# Patient Record
Sex: FEMALE | Race: BLACK OR AFRICAN AMERICAN | Employment: PART TIME | URBAN - METROPOLITAN AREA
[De-identification: names, ages, dates, MRNs, and addresses within clinical notes are randomized per-mention and may not be internally consistent; named-entity substitution may affect disease eponyms.]

---
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
## 2018-02-25 ENCOUNTER — HOSPITAL ENCOUNTER (OUTPATIENT)
Dept: GENERAL RADIOLOGY | Age: 18
Discharge: HOME | End: 2018-02-25
Payer: MEDICAID

## 2018-02-25 DIAGNOSIS — R07.9 CHEST PAIN, UNSPECIFIED TYPE: ICD-10-CM

## 2019-08-15 ENCOUNTER — APPOINTMENT (OUTPATIENT)
Dept: GENERAL RADIOLOGY | Age: 19
End: 2019-08-15
Payer: MEDICAID

## 2019-08-15 ENCOUNTER — HOSPITAL ENCOUNTER (EMERGENCY)
Age: 19
Discharge: HOME | End: 2019-08-16
Attending: EMERGENCY MEDICINE
Payer: MEDICAID

## 2019-08-15 DIAGNOSIS — I87.2 VENOUS STASIS DERMATITIS OF BOTH LOWER EXTREMITIES: Primary | ICD-10-CM

## 2019-08-15 LAB
ABSOLUTE EOS #: 0.6 K/UL (ref 0–0.4)
ABSOLUTE IMMATURE GRANULOCYTE: ABNORMAL K/UL (ref 0–0.3)
ABSOLUTE LYMPH #: 2.9 K/UL (ref 1–4.8)
ABSOLUTE MONO #: 0.6 K/UL (ref 0.1–1.3)
ANION GAP SERPL CALCULATED.3IONS-SCNC: 9 MMOL/L (ref 9–17)
BASOPHILS # BLD: 1 % (ref 0–2)
BASOPHILS ABSOLUTE: 0.1 K/UL (ref 0–0.2)
BUN BLDV-MCNC: 15 MG/DL (ref 6–20)
BUN/CREAT BLD: NORMAL (ref 9–20)
CALCIUM SERPL-MCNC: 9.4 MG/DL (ref 8.6–10.4)
CHLORIDE BLD-SCNC: 103 MMOL/L (ref 98–107)
CO2: 26 MMOL/L (ref 20–31)
CREAT SERPL-MCNC: 0.73 MG/DL (ref 0.5–0.9)
DIFFERENTIAL TYPE: ABNORMAL
EOSINOPHILS RELATIVE PERCENT: 7 % (ref 0–4)
GFR AFRICAN AMERICAN: >60 ML/MIN
GFR NON-AFRICAN AMERICAN: >60 ML/MIN
GFR SERPL CREATININE-BSD FRML MDRD: NORMAL ML/MIN/{1.73_M2}
GFR SERPL CREATININE-BSD FRML MDRD: NORMAL ML/MIN/{1.73_M2}
GLUCOSE BLD-MCNC: 82 MG/DL (ref 70–99)
HCT VFR BLD CALC: 43 % (ref 36–46)
HEMOGLOBIN: 14.6 G/DL (ref 12–16)
IMMATURE GRANULOCYTES: ABNORMAL %
INR BLD: 1
LYMPHOCYTES # BLD: 34 % (ref 25–45)
MCH RBC QN AUTO: 32.3 PG (ref 26–34)
MCHC RBC AUTO-ENTMCNC: 34 G/DL (ref 31–37)
MCV RBC AUTO: 95.2 FL (ref 80–100)
MONOCYTES # BLD: 8 % (ref 2–8)
NRBC AUTOMATED: ABNORMAL PER 100 WBC
PDW BLD-RTO: 13 % (ref 11.5–14.9)
PLATELET # BLD: 244 K/UL (ref 150–450)
PLATELET ESTIMATE: ABNORMAL
PMV BLD AUTO: 9.2 FL (ref 6–12)
POTASSIUM SERPL-SCNC: 4.1 MMOL/L (ref 3.7–5.3)
PROTHROMBIN TIME: 12.6 SEC (ref 11.8–14.6)
RBC # BLD: 4.52 M/UL (ref 4–5.2)
RBC # BLD: ABNORMAL 10*6/UL
SEG NEUTROPHILS: 50 % (ref 34–64)
SEGMENTED NEUTROPHILS ABSOLUTE COUNT: 4.3 K/UL (ref 1.3–9.1)
SODIUM BLD-SCNC: 138 MMOL/L (ref 135–144)
WBC # BLD: 8.5 K/UL (ref 4.5–13.5)
WBC # BLD: ABNORMAL 10*3/UL

## 2019-08-15 PROCEDURE — 36415 COLL VENOUS BLD VENIPUNCTURE: CPT

## 2019-08-15 PROCEDURE — 85025 COMPLETE CBC W/AUTO DIFF WBC: CPT

## 2019-08-15 PROCEDURE — 99283 EMERGENCY DEPT VISIT LOW MDM: CPT

## 2019-08-15 PROCEDURE — 85610 PROTHROMBIN TIME: CPT

## 2019-08-15 PROCEDURE — 85379 FIBRIN DEGRADATION QUANT: CPT

## 2019-08-15 PROCEDURE — 80048 BASIC METABOLIC PNL TOTAL CA: CPT

## 2019-08-15 PROCEDURE — 73630 X-RAY EXAM OF FOOT: CPT

## 2019-08-15 ASSESSMENT — ENCOUNTER SYMPTOMS
VOMITING: 0
PERI-ORBITAL EDEMA: 0
WHEEZING: 0

## 2019-08-16 VITALS
DIASTOLIC BLOOD PRESSURE: 70 MMHG | HEART RATE: 70 BPM | TEMPERATURE: 97.6 F | WEIGHT: 270 LBS | BODY MASS INDEX: 39.99 KG/M2 | RESPIRATION RATE: 16 BRPM | HEIGHT: 69 IN | OXYGEN SATURATION: 98 % | SYSTOLIC BLOOD PRESSURE: 130 MMHG

## 2019-08-16 LAB — D-DIMER QUANTITATIVE: <0.27 MG/L FEU (ref 0–0.59)

## 2019-08-16 RX ORDER — METHYLPREDNISOLONE 4 MG/1
TABLET ORAL
Qty: 1 KIT | Refills: 0 | Status: SHIPPED | OUTPATIENT
Start: 2019-08-16 | End: 2019-08-22

## 2019-08-16 NOTE — ED PROVIDER NOTES
eMERGENCY dEPARTMENT eNCOUnter   Independent Attestation     Pt Name: Sid Manzo  MRN: [id number]  [place] 2000  Date of evaluation: 8/16/19     Sid Manzo is a 22 y.o. female with CC: Rash      Based on the medical record the care appears appropriate. I was personally available for consultation in the Emergency Department.     Shaunna Montano MD  Attending Emergency Physician                   Shaunna Montano MD  08/16/19 6798

## 2019-08-16 NOTE — ED PROVIDER NOTES
[address] ED  eMERGENCY dEPARTMENT eNCOUnter      Pt Name: Satish Hines  MRN: [id number]  [place] 2000  Date of evaluation: 8/15/19      CHIEF COMPLAINT       Chief Complaint   Patient presents with    Rash         HISTORY OF PRESENT ILLNESS    Satish Hines is a 22 y.o. female who presents complaining of rash to the right lower medial leg started 3 days ago, has venous changes to the bilateral lower legs just above the ankles. Right great toe pain no injury or trauma, but has been exercising more than usual lately. Rash   Location:  Leg  Leg rash location:  R ankle, L ankle and R lower leg  Quality: itchiness and redness    Quality comment:  Right lower leg redness, tender with palpation  Onset quality:  Gradual  Duration:  3 days  Timing:  Constant  Progression:  Unchanged  Chronicity:  New  Context: not exposure to similar rash, not insect bite/sting, not medications and not sun exposure    Relieved by:  Nothing  Worsened by:  Nothing  Ineffective treatments:  None tried  Associated symptoms: joint pain (right great toe pain)    Associated symptoms: no fever, no headaches, no induration, no periorbital edema, not vomiting and not wheezing        REVIEW OF SYSTEMS       Review of Systems   Constitutional: Negative for fever. Respiratory: Negative for wheezing. Gastrointestinal: Negative for vomiting. Musculoskeletal: Positive for arthralgias (right great toe pain). Skin: Positive for rash. Neurological: Negative for headaches. All other systems reviewed and are negative. PAST MEDICAL HISTORY   History reviewed. No pertinent past medical history. SURGICAL HISTORY     History reviewed. No pertinent surgical history.     CURRENT MEDICATIONS       Previous Medications    DEXTROMETHORPHAN-GUAIFENESIN  MG TB12    Take 1 tablet by mouth 2 times daily    IBUPROFEN (ADVIL;MOTRIN) 800 MG TABLET        PSEUDOEPHEDRINE-GUAIFENESIN (MUCINEX D) 120-1200 MG TB12    Take 1 each by

## 2022-02-11 ENCOUNTER — HOSPITAL ENCOUNTER (OUTPATIENT)
Age: 22
Setting detail: SPECIMEN
Discharge: HOME | End: 2022-02-11

## 2022-02-14 LAB
-: NORMAL
C TRACH DNA GENITAL QL NAA+PROBE: NEGATIVE
N. GONORRHOEAE DNA: NEGATIVE
REASON FOR REJECTION: NORMAL
SPECIMEN DESCRIPTION: NORMAL
ZZ NTE CLEAN UP: ORDERED TEST: NORMAL
ZZ NTE WITH NAME CLEAN UP: SPECIMEN SOURCE: NORMAL

## 2022-02-17 LAB
HPV SAMPLE: NORMAL
HPV, GENOTYPE 16: NOT DETECTED
HPV, GENOTYPE 18: NOT DETECTED
HPV, HIGH RISK OTHER: NOT DETECTED
HPV, INTERPRETATION: NORMAL
SPECIMEN DESCRIPTION: NORMAL

## 2022-02-22 LAB — CYTOLOGY REPORT: NORMAL

## 2022-03-03 ENCOUNTER — HOSPITAL ENCOUNTER (OUTPATIENT)
Age: 22
Setting detail: SPECIMEN
Discharge: HOME | End: 2022-03-03

## 2022-03-04 LAB
CANDIDA SPECIES, DNA PROBE: NEGATIVE
GARDNERELLA VAGINALIS, DNA PROBE: NEGATIVE
SOURCE: NORMAL
TRICHOMONAS VAGINALIS DNA: NEGATIVE

## 2022-03-07 ENCOUNTER — HOSPITAL ENCOUNTER (OUTPATIENT)
Age: 22
Setting detail: SPECIMEN
Discharge: HOME | End: 2022-03-07

## 2022-03-08 LAB
ABSOLUTE EOS #: 0.13 K/UL (ref 0–0.44)
ABSOLUTE IMMATURE GRANULOCYTE: 0.03 K/UL (ref 0–0.3)
ABSOLUTE LYMPH #: 2.71 K/UL (ref 1.1–3.7)
ABSOLUTE MONO #: 0.43 K/UL (ref 0.1–1.2)
ALBUMIN SERPL-MCNC: 4.4 G/DL (ref 3.5–5.2)
ALBUMIN/GLOBULIN RATIO: 1.6 (ref 1–2.5)
ALP BLD-CCNC: 56 U/L (ref 35–104)
ALT SERPL-CCNC: 17 U/L (ref 5–33)
ANION GAP SERPL CALCULATED.3IONS-SCNC: 15 MMOL/L (ref 9–17)
AST SERPL-CCNC: 13 U/L
BASOPHILS # BLD: 1 % (ref 0–2)
BASOPHILS ABSOLUTE: 0.04 K/UL (ref 0–0.2)
BILIRUB SERPL-MCNC: 1.11 MG/DL (ref 0.3–1.2)
BUN BLDV-MCNC: 6 MG/DL (ref 6–20)
CALCIUM SERPL-MCNC: 9.4 MG/DL (ref 8.6–10.4)
CHLORIDE BLD-SCNC: 106 MMOL/L (ref 98–107)
CHOLESTEROL/HDL RATIO: 2.2
CHOLESTEROL: 119 MG/DL
CO2: 18 MMOL/L (ref 20–31)
CREAT SERPL-MCNC: 0.61 MG/DL (ref 0.5–0.9)
EOSINOPHILS RELATIVE PERCENT: 2 % (ref 1–4)
GFR AFRICAN AMERICAN: >60 ML/MIN
GFR NON-AFRICAN AMERICAN: >60 ML/MIN
GFR SERPL CREATININE-BSD FRML MDRD: ABNORMAL ML/MIN/{1.73_M2}
GLUCOSE BLD-MCNC: 83 MG/DL (ref 70–99)
HCT VFR BLD CALC: 40.7 % (ref 36.3–47.1)
HDLC SERPL-MCNC: 55 MG/DL
HEMOGLOBIN: 13.7 G/DL (ref 11.9–15.1)
IMMATURE GRANULOCYTES: 1 %
LDL CHOLESTEROL: 55 MG/DL (ref 0–130)
LYMPHOCYTES # BLD: 44 % (ref 24–43)
MCH RBC QN AUTO: 31.6 PG (ref 25.2–33.5)
MCHC RBC AUTO-ENTMCNC: 33.7 G/DL (ref 28.4–34.8)
MCV RBC AUTO: 94 FL (ref 82.6–102.9)
MONOCYTES # BLD: 7 % (ref 3–12)
NRBC AUTOMATED: 0 PER 100 WBC
PDW BLD-RTO: 13.2 % (ref 11.8–14.4)
PLATELET # BLD: 229 K/UL (ref 138–453)
PMV BLD AUTO: 12.6 FL (ref 8.1–13.5)
POTASSIUM SERPL-SCNC: 4.2 MMOL/L (ref 3.7–5.3)
RBC # BLD: 4.33 M/UL (ref 3.95–5.11)
SEG NEUTROPHILS: 45 % (ref 36–65)
SEGMENTED NEUTROPHILS ABSOLUTE COUNT: 2.69 K/UL (ref 1.5–8.1)
SODIUM BLD-SCNC: 139 MMOL/L (ref 135–144)
TOTAL PROTEIN: 7.2 G/DL (ref 6.4–8.3)
TRIGL SERPL-MCNC: 46 MG/DL
TSH SERPL DL<=0.05 MIU/L-ACNC: 0.93 MIU/L (ref 0.3–5)
VITAMIN D 25-HYDROXY: 28.5 NG/ML
WBC # BLD: 6 K/UL (ref 3.5–11.3)

## 2022-10-28 ENCOUNTER — HOSPITAL ENCOUNTER (OUTPATIENT)
Age: 22
Setting detail: SPECIMEN
Discharge: HOME | End: 2022-10-28

## 2022-10-29 LAB
SOURCE: NORMAL
TRICHOMONAS VAGINALI, MOLECULAR: NEGATIVE

## 2022-10-31 LAB
C. TRACHOMATIS DNA ,URINE: NEGATIVE
N. GONORRHOEAE DNA, URINE: NEGATIVE
SPECIMEN DESCRIPTION: NORMAL

## 2022-11-10 ENCOUNTER — HOSPITAL ENCOUNTER (OUTPATIENT)
Age: 22
Setting detail: SPECIMEN
Discharge: HOME | End: 2022-11-10

## 2022-11-11 LAB
BACTERIA: ABNORMAL
BILIRUBIN URINE: NEGATIVE
CASTS UA: ABNORMAL /LPF (ref 0–8)
COLOR: YELLOW
EPITHELIAL CELLS UA: ABNORMAL /HPF (ref 0–5)
GLUCOSE URINE: NEGATIVE
KETONES, URINE: ABNORMAL
LEUKOCYTE ESTERASE, URINE: ABNORMAL
NITRITE, URINE: NEGATIVE
PH UA: 5.5 (ref 5–8)
PROTEIN UA: NEGATIVE
RBC UA: ABNORMAL /HPF (ref 0–4)
SPECIFIC GRAVITY UA: 1.03 (ref 1–1.03)
TURBIDITY: CLEAR
URINE HGB: NEGATIVE
UROBILINOGEN, URINE: NORMAL
WBC UA: ABNORMAL /HPF (ref 0–5)

## 2022-11-12 LAB
CULTURE: ABNORMAL
SPECIMEN DESCRIPTION: ABNORMAL

## 2022-12-05 ENCOUNTER — APPOINTMENT (OUTPATIENT)
Dept: CT IMAGING | Age: 22
End: 2022-12-05
Payer: MEDICAID

## 2022-12-05 ENCOUNTER — HOSPITAL ENCOUNTER (EMERGENCY)
Age: 22
Discharge: ANOTHER HOSPITAL | End: 2022-12-06
Attending: EMERGENCY MEDICINE
Payer: MEDICAID

## 2022-12-05 VITALS
DIASTOLIC BLOOD PRESSURE: 75 MMHG | HEART RATE: 84 BPM | TEMPERATURE: 97.3 F | SYSTOLIC BLOOD PRESSURE: 143 MMHG | RESPIRATION RATE: 16 BRPM | OXYGEN SATURATION: 94 %

## 2022-12-05 DIAGNOSIS — F23 ACUTE PSYCHOSIS (HCC): Primary | ICD-10-CM

## 2022-12-05 LAB
ABSOLUTE EOS #: 0.03 K/UL (ref 0–0.44)
ABSOLUTE IMMATURE GRANULOCYTE: 0.04 K/UL (ref 0–0.3)
ABSOLUTE LYMPH #: 1.83 K/UL (ref 1.1–3.7)
ABSOLUTE MONO #: 0.55 K/UL (ref 0.1–1.2)
ACETAMINOPHEN LEVEL: <5 UG/ML (ref 10–30)
ALBUMIN SERPL-MCNC: 4.3 G/DL (ref 3.5–5.2)
ALBUMIN/GLOBULIN RATIO: 1.4 (ref 1–2.5)
ALP BLD-CCNC: 80 U/L (ref 35–104)
ALT SERPL-CCNC: 14 U/L (ref 5–33)
AMPHETAMINE SCREEN URINE: NEGATIVE
ANION GAP SERPL CALCULATED.3IONS-SCNC: 12 MMOL/L (ref 9–17)
AST SERPL-CCNC: 17 U/L
BARBITURATE SCREEN URINE: NEGATIVE
BASOPHILS # BLD: 1 % (ref 0–2)
BASOPHILS ABSOLUTE: 0.06 K/UL (ref 0–0.2)
BENZODIAZEPINE SCREEN, URINE: POSITIVE
BILIRUB SERPL-MCNC: 0.8 MG/DL (ref 0.3–1.2)
BILIRUBIN URINE: NEGATIVE
BUN BLDV-MCNC: 10 MG/DL (ref 6–20)
CALCIUM SERPL-MCNC: 9.8 MG/DL (ref 8.6–10.4)
CANNABINOID SCREEN URINE: POSITIVE
CASTS UA: ABNORMAL /LPF (ref 0–8)
CHLORIDE BLD-SCNC: 103 MMOL/L (ref 98–107)
CO2: 22 MMOL/L (ref 20–31)
COCAINE METABOLITE, URINE: NEGATIVE
COLOR: YELLOW
CREAT SERPL-MCNC: 0.49 MG/DL (ref 0.5–0.9)
EOSINOPHILS RELATIVE PERCENT: 0 % (ref 1–4)
EPITHELIAL CELLS UA: ABNORMAL /HPF (ref 0–5)
ETHANOL PERCENT: <0.01 %
ETHANOL: <10 MG/DL
FENTANYL URINE: NEGATIVE
GFR SERPL CREATININE-BSD FRML MDRD: >60 ML/MIN/1.73M2
GLUCOSE BLD-MCNC: 81 MG/DL (ref 70–99)
GLUCOSE URINE: NEGATIVE
HCG QUALITATIVE: NEGATIVE
HCT VFR BLD CALC: 39.2 % (ref 36.3–47.1)
HEMOGLOBIN: 13.6 G/DL (ref 11.9–15.1)
IMMATURE GRANULOCYTES: 1 %
KETONES, URINE: ABNORMAL
LEUKOCYTE ESTERASE, URINE: NEGATIVE
LYMPHOCYTES # BLD: 21 % (ref 24–43)
MCH RBC QN AUTO: 31.8 PG (ref 25.2–33.5)
MCHC RBC AUTO-ENTMCNC: 34.7 G/DL (ref 28.4–34.8)
MCV RBC AUTO: 91.6 FL (ref 82.6–102.9)
METHADONE SCREEN, URINE: NEGATIVE
MONOCYTES # BLD: 6 % (ref 3–12)
NITRITE, URINE: NEGATIVE
NRBC AUTOMATED: 0 PER 100 WBC
OPIATES, URINE: NEGATIVE
OXYCODONE SCREEN URINE: NEGATIVE
PDW BLD-RTO: 12.5 % (ref 11.8–14.4)
PH UA: 6.5 (ref 5–8)
PHENCYCLIDINE, URINE: NEGATIVE
PLATELET # BLD: 289 K/UL (ref 138–453)
PMV BLD AUTO: 10.9 FL (ref 8.1–13.5)
POTASSIUM SERPL-SCNC: 4.2 MMOL/L (ref 3.7–5.3)
PROTEIN UA: NEGATIVE
RBC # BLD: 4.28 M/UL (ref 3.95–5.11)
RBC UA: ABNORMAL /HPF (ref 0–4)
SALICYLATE LEVEL: <1 MG/DL (ref 3–10)
SEG NEUTROPHILS: 71 % (ref 36–65)
SEGMENTED NEUTROPHILS ABSOLUTE COUNT: 6.07 K/UL (ref 1.5–8.1)
SODIUM BLD-SCNC: 137 MMOL/L (ref 135–144)
SPECIFIC GRAVITY UA: 1.01 (ref 1–1.03)
TEST INFORMATION: ABNORMAL
TOTAL PROTEIN: 7.4 G/DL (ref 6.4–8.3)
TOXIC TRICYCLIC SC,BLOOD: NEGATIVE
TURBIDITY: CLEAR
URINE HGB: NEGATIVE
UROBILINOGEN, URINE: NORMAL
WBC # BLD: 8.6 K/UL (ref 3.5–11.3)
WBC UA: ABNORMAL /HPF (ref 0–5)

## 2022-12-05 PROCEDURE — 80053 COMPREHEN METABOLIC PANEL: CPT

## 2022-12-05 PROCEDURE — 80143 DRUG ASSAY ACETAMINOPHEN: CPT

## 2022-12-05 PROCEDURE — 85025 COMPLETE CBC W/AUTO DIFF WBC: CPT

## 2022-12-05 PROCEDURE — 80179 DRUG ASSAY SALICYLATE: CPT

## 2022-12-05 PROCEDURE — 6370000000 HC RX 637 (ALT 250 FOR IP): Performed by: STUDENT IN AN ORGANIZED HEALTH CARE EDUCATION/TRAINING PROGRAM

## 2022-12-05 PROCEDURE — 87086 URINE CULTURE/COLONY COUNT: CPT

## 2022-12-05 PROCEDURE — G0480 DRUG TEST DEF 1-7 CLASSES: HCPCS

## 2022-12-05 PROCEDURE — 6360000002 HC RX W HCPCS: Performed by: STUDENT IN AN ORGANIZED HEALTH CARE EDUCATION/TRAINING PROGRAM

## 2022-12-05 PROCEDURE — 81001 URINALYSIS AUTO W/SCOPE: CPT

## 2022-12-05 PROCEDURE — 84703 CHORIONIC GONADOTROPIN ASSAY: CPT

## 2022-12-05 PROCEDURE — 80307 DRUG TEST PRSMV CHEM ANLYZR: CPT

## 2022-12-05 PROCEDURE — 70450 CT HEAD/BRAIN W/O DYE: CPT

## 2022-12-05 RX ORDER — OLANZAPINE 5 MG/1
10 TABLET, ORALLY DISINTEGRATING ORAL ONCE
Status: COMPLETED | OUTPATIENT
Start: 2022-12-05 | End: 2022-12-05

## 2022-12-05 RX ORDER — MIDAZOLAM HYDROCHLORIDE 2 MG/2ML
4 INJECTION, SOLUTION INTRAMUSCULAR; INTRAVENOUS ONCE
Status: COMPLETED | OUTPATIENT
Start: 2022-12-05 | End: 2022-12-05

## 2022-12-05 RX ORDER — DIPHENHYDRAMINE HYDROCHLORIDE 50 MG/ML
25 INJECTION INTRAMUSCULAR; INTRAVENOUS ONCE
Status: COMPLETED | OUTPATIENT
Start: 2022-12-05 | End: 2022-12-05

## 2022-12-05 RX ORDER — MIDAZOLAM HYDROCHLORIDE 2 MG/2ML
4 INJECTION, SOLUTION INTRAMUSCULAR; INTRAVENOUS ONCE
Status: DISCONTINUED | OUTPATIENT
Start: 2022-12-05 | End: 2022-12-06 | Stop reason: HOSPADM

## 2022-12-05 RX ORDER — DIPHENHYDRAMINE HYDROCHLORIDE 50 MG/ML
25 INJECTION INTRAMUSCULAR; INTRAVENOUS
Status: COMPLETED | OUTPATIENT
Start: 2022-12-05 | End: 2022-12-05

## 2022-12-05 RX ORDER — HALOPERIDOL 5 MG/ML
5 INJECTION INTRAMUSCULAR
Status: COMPLETED | OUTPATIENT
Start: 2022-12-05 | End: 2022-12-05

## 2022-12-05 RX ORDER — HALOPERIDOL 5 MG/ML
5 INJECTION INTRAMUSCULAR ONCE
Status: COMPLETED | OUTPATIENT
Start: 2022-12-05 | End: 2022-12-05

## 2022-12-05 RX ADMIN — MIDAZOLAM HYDROCHLORIDE 4 MG: 1 INJECTION, SOLUTION INTRAMUSCULAR; INTRAVENOUS at 14:25

## 2022-12-05 RX ADMIN — DIPHENHYDRAMINE HYDROCHLORIDE 25 MG: 50 INJECTION, SOLUTION INTRAMUSCULAR; INTRAVENOUS at 19:11

## 2022-12-05 RX ADMIN — OLANZAPINE 10 MG: 5 TABLET, ORALLY DISINTEGRATING ORAL at 18:56

## 2022-12-05 RX ADMIN — HALOPERIDOL LACTATE 5 MG: 5 INJECTION, SOLUTION INTRAMUSCULAR at 19:11

## 2022-12-05 RX ADMIN — DIPHENHYDRAMINE HYDROCHLORIDE 25 MG: 50 INJECTION, SOLUTION INTRAMUSCULAR; INTRAVENOUS at 14:26

## 2022-12-05 RX ADMIN — HALOPERIDOL LACTATE 5 MG: 5 INJECTION, SOLUTION INTRAMUSCULAR at 14:26

## 2022-12-05 NOTE — ED NOTES
Patient walks out of room wearing her sunglasses stating \"oh my god I forgot I had my phone! This is so silly. Oh my god I cannot stop laughing! \" And walks back into room. Then walks out of room again and states that she doesn't have signal to call her mom on her phone and \"Let me go outside and call her and I will be right back! \" Patient now standing at nurses station playing on her phone talking to .      David Sweeney RN  12/05/22 1028
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
## 2022-12-05 NOTE — ED NOTES
Patient walked out of room smiling and said very cheerfully \"Hi, sorry I came in all bunched up and not myself. \" This nurse told patient to return to her room and she did.       Margaret Obando RN  12/05/22 5550
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
## 2022-12-05 NOTE — ED NOTES
Patient resting on cart. Not sleeping just resting with her eyes open.      Sandra Quarles RN  12/05/22 0376
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
## 2022-12-05 NOTE — ED NOTES
Patient becoming anxious, pacing, covering head with blanket, and trying to close door. Zyprexa given. Patient took medication with no issues.      Andrea Mark RN  12/05/22 4437 none
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
## 2022-12-05 NOTE — ED NOTES
Consult received by Dr. Thalia Ramirez  Introduced self, explained forensic nursing services, and mandated reporting services. Patient sat on hospital furniture with eyes closed the entire time writer was in room. Patient stated she wanted writer to call her mom, brother and sister before she spoke to anyone in the hospital. Writer stated that those phone calls were going to be made right away and went to make them. Patient then stated \"did you not have a phone in here, I already gave you these numbers, call Dr. Cain Beckford at [facility] or I'm leaving. \" Patient is tangential at times. Patient continued to stay in the room but put her coat on stating she was leaving but very tired. Patient kept repeating, \"I'm not pinked so I'm free to go, you can't keep me here\". Writer spoke to social work, primary nurse and doctors. Updated everyone  on patients orientation and at this time patient is not accepting of services or speaking to writer, and that she wishes to leave.       Kelsie Gtz RN  12/05/22 5740

## 2022-12-05 NOTE — ED NOTES
Delay in triage and care due to the fact that patient is refusing. Attending and resident in room to assess patient capacity to refuse. At this time social work is going to evaluate patient and contact mom for more information as patient is determined to not be at baseline mental status.      Lacho Darby RN  12/05/22 9254

## 2022-12-06 ENCOUNTER — HOSPITAL ENCOUNTER (INPATIENT)
Age: 22
LOS: 2 days | Discharge: HOME | End: 2022-12-08
Attending: PSYCHIATRY & NEUROLOGY | Admitting: PSYCHIATRY & NEUROLOGY
Payer: MEDICAID

## 2022-12-06 LAB
CULTURE: NORMAL
SPECIMEN DESCRIPTION: NORMAL

## 2022-12-06 PROCEDURE — 6370000000 HC RX 637 (ALT 250 FOR IP): Performed by: PSYCHIATRY & NEUROLOGY

## 2022-12-06 PROCEDURE — 6360000002 HC RX W HCPCS: Performed by: PSYCHIATRY & NEUROLOGY

## 2022-12-06 PROCEDURE — 1240000000 HC EMOTIONAL WELLNESS R&B

## 2022-12-06 RX ORDER — GUAIFENESIN, PSEUDOEPHEDRINE HYDROCHLORIDE 600; 60 MG/1; MG/1
2 TABLET, EXTENDED RELEASE ORAL EVERY 12 HOURS
Status: DISCONTINUED | OUTPATIENT
Start: 2022-12-06 | End: 2022-12-08 | Stop reason: HOSPADM

## 2022-12-06 RX ORDER — HYDROXYZINE 50 MG/1
50 TABLET, FILM COATED ORAL 3 TIMES DAILY PRN
Status: DISCONTINUED | OUTPATIENT
Start: 2022-12-06 | End: 2022-12-08 | Stop reason: HOSPADM

## 2022-12-06 RX ORDER — LORAZEPAM 2 MG/ML
2 INJECTION INTRAMUSCULAR EVERY 4 HOURS PRN
Status: DISCONTINUED | OUTPATIENT
Start: 2022-12-06 | End: 2022-12-08 | Stop reason: HOSPADM

## 2022-12-06 RX ORDER — LORAZEPAM 1 MG/1
2 TABLET ORAL EVERY 4 HOURS PRN
Status: DISCONTINUED | OUTPATIENT
Start: 2022-12-06 | End: 2022-12-08 | Stop reason: HOSPADM

## 2022-12-06 RX ORDER — RISPERIDONE 0.5 MG/1
0.5 TABLET ORAL 2 TIMES DAILY
Status: DISCONTINUED | OUTPATIENT
Start: 2022-12-06 | End: 2022-12-08 | Stop reason: HOSPADM

## 2022-12-06 RX ORDER — POLYETHYLENE GLYCOL 3350 17 G/17G
17 POWDER, FOR SOLUTION ORAL DAILY PRN
Status: DISCONTINUED | OUTPATIENT
Start: 2022-12-06 | End: 2022-12-08 | Stop reason: HOSPADM

## 2022-12-06 RX ORDER — HALOPERIDOL 5 MG/1
5 TABLET ORAL EVERY 4 HOURS PRN
Status: DISCONTINUED | OUTPATIENT
Start: 2022-12-06 | End: 2022-12-08 | Stop reason: HOSPADM

## 2022-12-06 RX ORDER — HALOPERIDOL 5 MG/ML
5 INJECTION INTRAMUSCULAR EVERY 4 HOURS PRN
Status: DISCONTINUED | OUTPATIENT
Start: 2022-12-06 | End: 2022-12-08 | Stop reason: HOSPADM

## 2022-12-06 RX ORDER — DIPHENHYDRAMINE HYDROCHLORIDE 50 MG/ML
50 INJECTION INTRAMUSCULAR; INTRAVENOUS EVERY 4 HOURS PRN
Status: DISCONTINUED | OUTPATIENT
Start: 2022-12-06 | End: 2022-12-08 | Stop reason: HOSPADM

## 2022-12-06 RX ORDER — MAGNESIUM HYDROXIDE/ALUMINUM HYDROXICE/SIMETHICONE 120; 1200; 1200 MG/30ML; MG/30ML; MG/30ML
30 SUSPENSION ORAL EVERY 6 HOURS PRN
Status: DISCONTINUED | OUTPATIENT
Start: 2022-12-06 | End: 2022-12-08 | Stop reason: HOSPADM

## 2022-12-06 RX ORDER — TRAZODONE HYDROCHLORIDE 50 MG/1
50 TABLET ORAL NIGHTLY PRN
Status: DISCONTINUED | OUTPATIENT
Start: 2022-12-06 | End: 2022-12-06

## 2022-12-06 RX ORDER — IBUPROFEN 400 MG/1
400 TABLET ORAL EVERY 6 HOURS PRN
Status: DISCONTINUED | OUTPATIENT
Start: 2022-12-06 | End: 2022-12-08 | Stop reason: HOSPADM

## 2022-12-06 RX ORDER — ESCITALOPRAM OXALATE 10 MG/1
5 TABLET ORAL DAILY
Status: DISCONTINUED | OUTPATIENT
Start: 2022-12-06 | End: 2022-12-08 | Stop reason: HOSPADM

## 2022-12-06 RX ORDER — ACETAMINOPHEN 325 MG/1
650 TABLET ORAL EVERY 4 HOURS PRN
Status: DISCONTINUED | OUTPATIENT
Start: 2022-12-06 | End: 2022-12-08 | Stop reason: HOSPADM

## 2022-12-06 RX ORDER — TRAZODONE HYDROCHLORIDE 50 MG/1
50 TABLET ORAL NIGHTLY PRN
Status: DISCONTINUED | OUTPATIENT
Start: 2022-12-06 | End: 2022-12-08 | Stop reason: HOSPADM

## 2022-12-06 RX ADMIN — ESCITALOPRAM OXALATE 5 MG: 10 TABLET ORAL at 13:44

## 2022-12-06 RX ADMIN — DIPHENHYDRAMINE HYDROCHLORIDE 50 MG: 50 INJECTION, SOLUTION INTRAMUSCULAR; INTRAVENOUS at 23:56

## 2022-12-06 RX ADMIN — HYDROXYZINE HYDROCHLORIDE 50 MG: 50 TABLET, FILM COATED ORAL at 03:08

## 2022-12-06 RX ADMIN — TRAZODONE HYDROCHLORIDE 50 MG: 50 TABLET ORAL at 20:46

## 2022-12-06 RX ADMIN — HALOPERIDOL LACTATE 5 MG: 5 INJECTION, SOLUTION INTRAMUSCULAR at 23:56

## 2022-12-06 RX ADMIN — TRAZODONE HYDROCHLORIDE 50 MG: 50 TABLET ORAL at 03:08

## 2022-12-06 RX ADMIN — IBUPROFEN 400 MG: 400 TABLET, FILM COATED ORAL at 03:07

## 2022-12-06 RX ADMIN — HYDROXYZINE HYDROCHLORIDE 50 MG: 50 TABLET, FILM COATED ORAL at 20:46

## 2022-12-06 RX ADMIN — LORAZEPAM 2 MG: 2 INJECTION INTRAMUSCULAR; INTRAVENOUS at 23:56

## 2022-12-06 RX ADMIN — RISPERIDONE 0.5 MG: 0.5 TABLET ORAL at 13:44

## 2022-12-06 RX ADMIN — RISPERIDONE 0.5 MG: 0.5 TABLET ORAL at 20:46

## 2022-12-06 RX ADMIN — IBUPROFEN 400 MG: 400 TABLET, FILM COATED ORAL at 14:51

## 2022-12-06 RX ADMIN — IBUPROFEN 400 MG: 400 TABLET, FILM COATED ORAL at 20:46

## 2022-12-06 ASSESSMENT — PAIN SCALES - GENERAL
PAINLEVEL_OUTOF10: 0
PAINLEVEL_OUTOF10: 5
PAINLEVEL_OUTOF10: 3

## 2022-12-06 ASSESSMENT — PATIENT HEALTH QUESTIONNAIRE - PHQ9: SUM OF ALL RESPONSES TO PHQ QUESTIONS 1-9: 16

## 2022-12-06 ASSESSMENT — PAIN DESCRIPTION - LOCATION: LOCATION: BACK;NECK

## 2022-12-06 ASSESSMENT — SLEEP AND FATIGUE QUESTIONNAIRES
AVERAGE NUMBER OF SLEEP HOURS: 7
DO YOU HAVE DIFFICULTY SLEEPING: YES
SLEEP PATTERN: DIFFICULTY FALLING ASLEEP
DO YOU USE A SLEEP AID: NO

## 2022-12-06 ASSESSMENT — LIFESTYLE VARIABLES
HOW MANY STANDARD DRINKS CONTAINING ALCOHOL DO YOU HAVE ON A TYPICAL DAY: PATIENT DECLINED
HOW OFTEN DO YOU HAVE A DRINK CONTAINING ALCOHOL: NEVER

## 2022-12-06 ASSESSMENT — PAIN DESCRIPTION - DESCRIPTORS: DESCRIPTORS: ACHING;BURNING

## 2022-12-06 NOTE — ED NOTES
Patient increasingly agitated, tachypnic, and tachycardic. Loudly saying the lord's prayer over again and rocking back and forth on bed. Per resident ok to give haldol and benadryl. Patient tolerated well.      Dao Gonzalez RN  12/05/22 1911

## 2022-12-06 NOTE — GROUP NOTE
Group Therapy Note    Date: 12/6/2022    Group Start Time: 1330  Group End Time: 2582  Group Topic: Psychoeducation    ABRAHAM BHJUAN CARLOS Jimenez, SUZIES    Group Therapy Note    Attendees: 7       Patient's Goal:  Patient will identify benefits of creative expression for coping and stress management. Notes:  Patient attended group and participated. Patient was hesitant to participate initially but did join with prompting. Patient was able to participate independently but is guarded with writer.       Status After Intervention:  Improved    Participation Level: Interactive and Minimal    Participation Quality: Appropriate and Resistant      Speech:  hesitant      Thought Process/Content: Delusional  Flight of ideas      Affective Functioning: Blunted      Mood: anxious and fearful      Level of consciousness:  Alert      Response to Learning: Able to verbalize current knowledge/experience and Able to verbalize/acknowledge new learning      Endings: None Reported    Modes of Intervention: Education, Support, Socialization, and Exploration      Discipline Responsible: Psychoeducational Specialist      Signature:  Melissa Merritt, [address]

## 2022-12-06 NOTE — H&P
On license of [facility]    HISTORY AND PHYSICAL EXAMINATION/ CONSULT NOTE            Date:   12/6/2022  Patient name:  Jerry Escobedo  Date of admission:  12/6/2022  2:07 AM  MRN:   [id number]  Account:  [de-identified]  YOB: 2000  PCP:    MURPHY Dietrich  Room:   [facility]-  Code Status:    Full Code    Physician Requesting Consult: Yogesh Marshall MD    Reason for Consult: History and physical, medical management    Chief Complaint:     No chief complaint on file. Medical management    History Obtained From:     Patient, EMR, nursing staff    History of Present Illness:     49-year-old female admitted for acute psychosis    No significant medical history  Patient denies any chest pain palpitation cough difficulty breathing nausea vomiting diarrhea or urinary symptoms      Past Medical History:     History reviewed. No pertinent past medical history. Past Surgical History:     History reviewed. No pertinent surgical history. Medications Prior to Admission:     Prior to Admission medications    Medication Sig Start Date End Date Taking? Authorizing Provider   Dextromethorphan-guaiFENesin  MG TB12 Take 1 tablet by mouth 2 times daily  Patient not taking: Reported on 12/6/2022 4/19/17   Nito Rodriguez MD   Pseudoephedrine-guaiFENesin [facility]) 120-1200 MG TB12 Take 1 each by mouth 2 times daily  Patient not taking: Reported on 12/6/2022 3/23/17   Nito Rodriguez MD   V-R [place] LOTN Apply to skin as directed as needed. Patient not taking: Reported on 12/6/2022 4/19/16   Smooth Wisdom MD   ibuprofen (ADVIL;MOTRIN) 800 MG tablet  2/17/16   Historical Provider, MD        Allergies:     Patient has no known allergies. Social History:     Tobacco:    reports that she has been smoking cigarettes. She does not have any smokeless tobacco history on file. Alcohol:      reports no history of alcohol use.   Drug Use:  reports no history of drug use. Family History:     Family History   Problem Relation Age of Onset    High Blood Pressure Mother     Stroke Father     Diabetes Father     Diabetes Maternal Grandmother        Review of Systems:     Positive and Negative as described in HPI. Denies any shortness of breath or cough  Denies chest pain or palpitations  Denies abdominal pain, diarrhea vomiting  Denies any new numbness tremors or weakness. 10 point review of systems was negative other than mentioned above    Physical Exam:     BP (!) 132/96   Pulse (!) 109   Temp 97.7 °F (36.5 °C) (Oral)   Resp 14   Ht 5' 9\" (1.753 m)   Wt 278 lb (126.1 kg)   LMP 2022   BMI 41.05 kg/m²   Temp (24hrs), Av.3 °F (36.3 °C), Min:96.9 °F (36.1 °C), Max:97.7 °F (36.5 °C)    No results for input(s): POCGLU in the last 72 hours. No intake or output data in the 24 hours ending 22 1439    General Appearance:  alert, well appearing, and in no acute distress  Head:  normocephalic, atraumatic. Eye: no icterus, redness, pupils equal and reactive, extraocular eye movements intact, conjunctiva clear  Ear: normal external ear, no discharge, hearing intact  Nose:  no drainage noted  Mouth: mucous membranes moist  Neck: supple, no carotid bruits, thyroid not palpable  Lungs: Bilateral equal air entry, clear to ausculation, no wheezing, rales or rhonchi, normal effort  Cardiovascular: normal rate, regular rhythm, no murmur, gallop, rub.   Abdomen: Soft, nontender, nondistended, normal bowel sounds, no hepatomegaly or splenomegaly  Neurologic: There are no new focal motor or sensory deficits, normal muscle tone and bulk, no abnormal sensation, normal speech, cranial nerves II through XII grossly intact  Skin: No gross lesions, rashes, bruising or bleeding on exposed skin area  Extremities:  No joint swelling, no pedal edema or calf pain with palpation      Investigations:      Laboratory Testing:  Recent Results (from the past 24 hour(s)) CBC with Auto Differential    Collection Time: 12/05/22  3:43 PM   Result Value Ref Range    WBC 8.6 3.5 - 11.3 k/uL    RBC 4.28 3.95 - 5.11 m/uL    Hemoglobin 13.6 11.9 - 15.1 g/dL    Hematocrit 39.2 36.3 - 47.1 %    MCV 91.6 82.6 - 102.9 fL    MCH 31.8 25.2 - 33.5 pg    MCHC 34.7 28.4 - 34.8 g/dL    RDW 12.5 11.8 - 14.4 %    Platelets 075 418 - 318 k/uL    MPV 10.9 8.1 - 13.5 fL    NRBC Automated 0.0 0.0 per 100 WBC    Seg Neutrophils 71 (H) 36 - 65 %    Lymphocytes 21 (L) 24 - 43 %    Monocytes 6 3 - 12 %    Eosinophils % 0 (L) 1 - 4 %    Basophils 1 0 - 2 %    Immature Granulocytes 1 (H) 0 %    Segs Absolute 6.07 1.50 - 8.10 k/uL    Absolute Lymph # 1.83 1.10 - 3.70 k/uL    Absolute Mono # 0.55 0.10 - 1.20 k/uL    Absolute Eos # 0.03 0.00 - 0.44 k/uL    Basophils Absolute 0.06 0.00 - 0.20 k/uL    Absolute Immature Granulocyte 0.04 0.00 - 0.30 k/uL   Comprehensive Metabolic Panel    Collection Time: 12/05/22  3:43 PM   Result Value Ref Range    Glucose 81 70 - 99 mg/dL    BUN 10 6 - 20 mg/dL    Creatinine 0.49 (L) 0.50 - 0.90 mg/dL    Est, Glom Filt Rate >60 >60 mL/min/1.73m2    Calcium 9.8 8.6 - 10.4 mg/dL    Sodium 137 135 - 144 mmol/L    Potassium 4.2 3.7 - 5.3 mmol/L    Chloride 103 98 - 107 mmol/L    CO2 22 20 - 31 mmol/L    Anion Gap 12 9 - 17 mmol/L    Alkaline Phosphatase 80 35 - 104 U/L    ALT 14 5 - 33 U/L    AST 17 <32 U/L    Total Bilirubin 0.8 0.3 - 1.2 mg/dL    Total Protein 7.4 6.4 - 8.3 g/dL    Albumin 4.3 3.5 - 5.2 g/dL    Albumin/Globulin Ratio 1.4 1.0 - 2.5   TOX SCR, BLD, ED    Collection Time: 12/05/22  3:43 PM   Result Value Ref Range    Acetaminophen Level <5 (L) 10 - 30 ug/mL    Ethanol <10 <10 mg/dL    Ethanol percent <1.873 <9.126 %    Salicylate Lvl <1 (L) 3 - 10 mg/dL    Toxic Tricyclic Sc,Blood NEGATIVE NEGATIVE   HCG Qualitative, Serum    Collection Time: 12/05/22  3:43 PM   Result Value Ref Range    hCG Qual NEGATIVE NEGATIVE   Urinalysis with Microscopic    Collection Time: 12/05/22  4:23 PM   Result Value Ref Range    Color, UA Yellow Yellow    Turbidity UA Clear Clear    Glucose, Ur NEGATIVE NEGATIVE    Bilirubin Urine NEGATIVE NEGATIVE    Ketones, Urine MODERATE (A) NEGATIVE    Specific Gravity, UA 1.013 1.005 - 1.030    Urine Hgb NEGATIVE NEGATIVE    pH, UA 6.5 5.0 - 8.0    Protein, UA NEGATIVE NEGATIVE    Urobilinogen, Urine Normal Normal    Nitrite, Urine NEGATIVE NEGATIVE    Leukocyte Esterase, Urine NEGATIVE NEGATIVE    WBC, UA 2 TO 5 0 - 5 /HPF    RBC, UA 2 TO 5 0 - 4 /HPF    Casts UA  0 - 8 /LPF     2 TO 5 HYALINE Reference range defined for non-centrifuged specimen. Epithelial Cells UA 2 TO 5 0 - 5 /HPF   Urine Drug Screen    Collection Time: 12/05/22  4:23 PM   Result Value Ref Range    Amphetamine Screen, Ur NEGATIVE NEGATIVE    Barbiturate Screen, Ur NEGATIVE NEGATIVE    Benzodiazepine Screen, Urine POSITIVE (A) NEGATIVE    Cocaine Metabolite, Urine NEGATIVE NEGATIVE    Methadone Screen, Urine NEGATIVE NEGATIVE    Opiates, Urine NEGATIVE NEGATIVE    Phencyclidine, Urine NEGATIVE NEGATIVE    Cannabinoid Scrn, Ur POSITIVE (A) NEGATIVE    Oxycodone Screen, Ur NEGATIVE NEGATIVE    Fentanyl, Ur NEGATIVE NEGATIVE    Test Information       Assay provides medical screening only. The absence of expected drug(s) and/or metabolite(s) may indicate diluted or adulterated urine, limitations of testing or timing of collection. Culture, Urine    Collection Time: 12/05/22  4:26 PM    Specimen: Urine, straight catheter   Result Value Ref Range    Specimen Description . URINE,STRAIGHT CATHETER     Culture NO SIGNIFICANT GROWTH        Imaging/Diagonstics:  Recent data reviewed    Assessment :      Primary Problem  <principal problem not specified>    Active Hospital Problems    Diagnosis Date Noted    [facility] 12/05/2022     Priority: Medium       Plan:     Reason for consult: General medical management     Acute psychosis-management per psychiatry, will add TSH rule out organic cause  Labs and vitals reviewed and satisfactory liver enzymes normal   DVT prophylaxis-not required, patient is mobile    Consultations:   Andres Morillo MD  12/6/2022  2:39 PM    Copy sent to MURPHY Brothers    Please note that this chart was generated using voice recognition Dragon dictation software. Although every effort was made to ensure the accuracy of this automated transcription, some errors in transcription may have occurred.
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
## 2022-12-06 NOTE — PLAN OF CARE
Problem: Coping  Goal: Pt/Family able to verbalize concerns and demonstrate effective coping strategies  Description: INTERVENTIONS:  1. Assist patient/family to identify coping skills, available support systems and cultural and spiritual values  2. Provide emotional support, including active listening and acknowledgement of concerns of patient and caregivers  3. Reduce environmental stimuli, as able  4. Instruct patient/family in relaxation techniques, as appropriate  5. Assess for spiritual pain/suffering and initiate Spiritual Care, Psychosocial Clinical Specialist consults as needed  Outcome: Progressing     Problem: Depression/Self Harm  Goal: Effect of psychiatric condition will be minimized and patient will be protected from self harm  Description: INTERVENTIONS:  1. Assess impact of patient's symptoms on level of functioning, self care needs and offer support as indicated  2. Assess patient/family knowledge of depression, impact on illness and need for teaching  3. Provide emotional support, presence and reassurance  4. Assess for possible suicidal thoughts or ideation. If patient expresses suicidal thoughts or statements do not leave alone, initiate Suicide Precautions, move to a room close to the nursing station and obtain sitter  5. Initiate consults as appropriate with Mental Health Professional, Spiritual Care, Psychosocial CNS, and consider a recommendation to the [facility] for a Psychiatric Consultation  Outcome: Progressing   Pt currently denies any thoughts to harm self or others denies any hallucinations out in the milieu isolative aloof.
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
## 2022-12-06 NOTE — GROUP NOTE
Group Therapy Note    Date: 12/6/2022    Group Start Time: 1000  Group End Time: 9700  Group Topic: Psychotherapy    [address] [facility], [place]        Group Therapy Note    Attendees: 6/21       Patient refused to attend psychotherapy group at 10:00 am after encouragement from staff. 1:1 talk time provided as alternative to group session.       Discipline Responsible: /Counselor      Signature:  ZAIRA Yates LSW

## 2022-12-06 NOTE — ED NOTES
Patient accepted to room 120 at the [facility].  [facility] transport to [facility] at [address] [place] [address]  12/05/22 1108

## 2022-12-06 NOTE — PROGRESS NOTES
Behavioral Services  Medicare Certification Upon Admission    I certify that this patient's inpatient psychiatric hospital admission is medically necessary for:    [x] (1) Treatment which could reasonably be expected to improve this patient's condition,       [x] (2) Or for diagnostic study;     AND     [x](2) The inpatient psychiatric services are provided while the individual is under the care of a physician and are included in the individualized plan of care.     Estimated length of stay/service 4-7 days    Plan for post-hospital care home with outpatient [facility] f/u    Electronically signed by Carl Benson MD on 12/6/2022 at 12:47 PM

## 2022-12-06 NOTE — PLAN OF CARE
Problem: Coping  Goal: Pt/Family able to verbalize concerns and demonstrate effective coping strategies  Description: INTERVENTIONS:  1. Assist patient/family to identify coping skills, available support systems and cultural and spiritual values  2. Provide emotional support, including active listening and acknowledgement of concerns of patient and caregivers  3. Reduce environmental stimuli, as able  4. Instruct patient/family in relaxation techniques, as appropriate  5. Assess for spiritual pain/suffering and initiate Spiritual Care, Psychosocial Clinical Specialist consults as needed  12/6/2022 1704 by Urmila Pickard LPN  Outcome: Progressing     Problem: Depression/Self Harm  Goal: Effect of psychiatric condition will be minimized and patient will be protected from self harm  Description: INTERVENTIONS:  1. Assess impact of patient's symptoms on level of functioning, self care needs and offer support as indicated  2. Assess patient/family knowledge of depression, impact on illness and need for teaching  3. Provide emotional support, presence and reassurance  4. Assess for possible suicidal thoughts or ideation. If patient expresses suicidal thoughts or statements do not leave alone, initiate Suicide Precautions, move to a room close to the nursing station and obtain sitter  5.  Initiate consults as appropriate with Mental Health Professional, Spiritual Care, Psychosocial CNS, and consider a recommendation to the [facility] for a Psychiatric Consultation  12/6/2022 1704 by Urmila Pickard LPN  Outcome: Progressing
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
## 2022-12-06 NOTE — GROUP NOTE
Psych-Ed/Relapse Prevention Group Note        Date: December 6, 2022 Start Time: 11am  End Time: 11:45am      Number of Participants in Group & Unit Census:  4    Topic: Socialization and community resources    Goal of Group:Patient will demonstrate improved interpersonal skills. Comments:     Patient did not participate in Psych-Ed/Relapse Prevention group, despite staff encouragement and explanation of benefits. Patient remain seclusive to self. Q15 minute safety checks maintained for patient safety and will continue to encourage patient to attend unit programming.        Signature:  He Dodson [place]

## 2022-12-06 NOTE — BH NOTE
Patient given tobacco quitline number [phone] at this time, refusing to call at this time, states \" I just dont want to quit now\"- patient given information as to the dangers of long term tobacco use. Continue to reinforce the importance of tobacco cessation.
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
## 2022-12-06 NOTE — GROUP NOTE
Group Therapy Note    Date: 12/6/2022    Group Start Time: 0900  Group End Time: 0920  Group Topic: Community Meeting    ABRAHAM Lawrence        Group Therapy Note    Attendees:4       Patient's Goal: to stay awake    Notes:  Goal Setting Group    Status After Intervention:  Unchanged    Participation Level: Minimal    Participation Quality: Resistant      Speech:  normal      Thought Process/Content: Logical      Affective Functioning: Blunted and Flat      Mood: depressed      Level of consciousness:  Alert      Response to Learning: Able to verbalize current knowledge/experience      Endings: None Reported    Modes of Intervention: Education      Discipline Responsible: [address] [employer]      Signature:  Amaury Hawthorne
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
## 2022-12-07 LAB — TSH SERPL DL<=0.05 MIU/L-ACNC: 0.81 UIU/ML (ref 0.3–5)

## 2022-12-07 PROCEDURE — 36415 COLL VENOUS BLD VENIPUNCTURE: CPT

## 2022-12-07 PROCEDURE — 84443 ASSAY THYROID STIM HORMONE: CPT

## 2022-12-07 PROCEDURE — 6370000000 HC RX 637 (ALT 250 FOR IP): Performed by: PSYCHIATRY & NEUROLOGY

## 2022-12-07 PROCEDURE — APPSS30 APP SPLIT SHARED TIME 16-30 MINUTES: Performed by: NURSE PRACTITIONER

## 2022-12-07 PROCEDURE — 1240000000 HC EMOTIONAL WELLNESS R&B

## 2022-12-07 RX ORDER — PROPRANOLOL HYDROCHLORIDE 20 MG/1
10 TABLET ORAL 3 TIMES DAILY
Status: DISCONTINUED | OUTPATIENT
Start: 2022-12-07 | End: 2022-12-08 | Stop reason: HOSPADM

## 2022-12-07 RX ADMIN — ESCITALOPRAM OXALATE 5 MG: 10 TABLET ORAL at 08:08

## 2022-12-07 RX ADMIN — PROPRANOLOL HYDROCHLORIDE 10 MG: 20 TABLET ORAL at 15:25

## 2022-12-07 RX ADMIN — NICOTINE POLACRILEX 2 MG: 2 GUM, CHEWING BUCCAL at 21:57

## 2022-12-07 RX ADMIN — HYDROXYZINE HYDROCHLORIDE 50 MG: 50 TABLET, FILM COATED ORAL at 21:57

## 2022-12-07 RX ADMIN — NICOTINE POLACRILEX 2 MG: 2 GUM, CHEWING BUCCAL at 19:31

## 2022-12-07 RX ADMIN — NICOTINE POLACRILEX 2 MG: 2 GUM, CHEWING BUCCAL at 18:08

## 2022-12-07 RX ADMIN — PROPRANOLOL HYDROCHLORIDE 10 MG: 20 TABLET ORAL at 21:55

## 2022-12-07 RX ADMIN — PROPRANOLOL HYDROCHLORIDE 10 MG: 20 TABLET ORAL at 11:19

## 2022-12-07 RX ADMIN — RISPERIDONE 0.5 MG: 0.5 TABLET ORAL at 08:08

## 2022-12-07 RX ADMIN — TRAZODONE HYDROCHLORIDE 50 MG: 50 TABLET ORAL at 21:57

## 2022-12-07 RX ADMIN — RISPERIDONE 0.5 MG: 0.5 TABLET ORAL at 21:57

## 2022-12-07 RX ADMIN — HYDROXYZINE HYDROCHLORIDE 50 MG: 50 TABLET, FILM COATED ORAL at 08:08

## 2022-12-07 RX ADMIN — GUAIFENESIN AND PSEUDOEPHEDRINE HYDROCHLORIDE 2 TABLET: 600; 60 TABLET, EXTENDED RELEASE ORAL at 21:55

## 2022-12-07 NOTE — PROGRESS NOTES
Daily Progress Note  12/7/2022    Patient Name: Arley Cornejo    CHIEF COMPLAINT:  Acute Psychsois          SUBJECTIVE:     Patient received IM haloperidol and Benadryl the previous evening for agitation. Noted that patient was yelling and slamming the door to her room repeatedly, and was not redirectable as other patients were attempting to sleep. Patient seen face-to-face for follow-up assessment. She is cooperative with discussion. Minimizing of reason for admission. Patient does not believe that she has mental illness and is not forthcoming regarding discussing any psychotic features. Patient's affect is elevated and her thought process is rapid at times. She is able to engage in mostly linear conversation, but some of her statements are illogical.  She mentions [employer] and how people have been out to get her, but not agreeable to discuss this topic more. Patient was started on escitalopram, propranolol and risperidone. She has been compliant with this regimen. Denies any significant side effects, but does state that she has felt drowsy today. She denies feeling dizzy or lightheaded are within appropriate range. She states that she did not sleep well overnight and verbalizes not feeling safe here on the unit, and being on edge at night. Patient did utilize trazodone and hydroxyzine the previous evening. Also utilizing hydroxyzine for increased anxiety today, which she verbalizes feeling that it has been beneficial.    Patient has yet to demonstrate stability and requires inpatient hospitalization for safety.     Psych med compliant: [x] Yes    [] No     E-meds in last 24 hrs: [x] Yes   [] No    Appetite:  [x] Normal/Adequate/Unchanged  [] Increased  [] Decreased      Sleep:       [] Normal/Adequate/Unchanged  [x] Fair  [] Poor      Group Attendance:   [x] Yes  [] Selectively    [] No    Medication Side Effects: Fatigue         Mental Status Exam  Level of consciousness: Alert and awake Appearance: Appropriate attire for setting, seated in chair, with fair  grooming and hygiene   Behavior/Motor: Approachable, no psychomotor abnormalities at this time  Attitude toward examiner: Semi-cooperative, evasive, good eye contact  Speech: Rapid at times, normal volume and tone, good articulation  Mood: Patient reports \"good\"  Affect: Incongruent, anxious  Thought processes: linear, rapid, and illogical statements at times  Thought content: Denies homicidal ideation  Suicidal Ideation: Denies suicidal ideation  Delusions: Current paranoia  Perceptual Disturbance: Patient denies any perceptual disturbances. Does not appear to be responding to internal stimuli. Cognition: Oriented to self, location, time, and situation  Memory: intact  Insight & Judgement: poor     Data   height is 5' 9\" (1.753 m) and weight is 278 lb (126.1 kg). Her temporal temperature is 97.8 °F (36.6 °C). Her blood pressure is 123/68 and her pulse is 82. Her respiration is 14.    Labs:   Admission on 12/06/2022   Component Date Value Ref Range Status    TSH 12/07/2022 0.81  0.30 - 5.00 uIU/mL Final   Admission on 12/05/2022, Discharged on 12/06/2022   Component Date Value Ref Range Status    WBC 12/05/2022 8.6  3.5 - 11.3 k/uL Final    RBC 12/05/2022 4.28  3.95 - 5.11 m/uL Final    Hemoglobin 12/05/2022 13.6  11.9 - 15.1 g/dL Final    Hematocrit 12/05/2022 39.2  36.3 - 47.1 % Final    MCV 12/05/2022 91.6  82.6 - 102.9 fL Final    MCH 12/05/2022 31.8  25.2 - 33.5 pg Final    MCHC 12/05/2022 34.7  28.4 - 34.8 g/dL Final    RDW 12/05/2022 12.5  11.8 - 14.4 % Final    Platelets 66/96/6765 289  138 - 453 k/uL Final    MPV 12/05/2022 10.9  8.1 - 13.5 fL Final    NRBC Automated 12/05/2022 0.0  0.0 per 100 WBC Final    Seg Neutrophils 12/05/2022 71 (A)  36 - 65 % Final    Lymphocytes 12/05/2022 21 (A)  24 - 43 % Final    Monocytes 12/05/2022 6  3 - 12 % Final    Eosinophils % 12/05/2022 0 (A)  1 - 4 % Final    Basophils 12/05/2022 1  0 - 2 % Final    Immature Granulocytes 12/05/2022 1 (A)  0 % Final    Segs Absolute 12/05/2022 6.07  1.50 - 8.10 k/uL Final    Absolute Lymph # 12/05/2022 1.83  1.10 - 3.70 k/uL Final    Absolute Mono # 12/05/2022 0.55  0.10 - 1.20 k/uL Final    Absolute Eos # 12/05/2022 0.03  0.00 - 0.44 k/uL Final    Basophils Absolute 12/05/2022 0.06  0.00 - 0.20 k/uL Final    Absolute Immature Granulocyte 12/05/2022 0.04  0.00 - 0.30 k/uL Final    Glucose 12/05/2022 81  70 - 99 mg/dL Final    BUN 12/05/2022 10  6 - 20 mg/dL Final    Creatinine 12/05/2022 0.49 (A)  0.50 - 0.90 mg/dL Final    Est, Glom Filt Rate 12/05/2022 >60  >60 mL/min/1.73m2 Final    Comment:       Effective Oct 3, 2022        These results are not intended for use in patients <25years of age. eGFR results are calculated without a race factor using the 2021 CKD-EPI equation. Careful clinical correlation is recommended, particularly when comparing to results   calculated using previous equations. The CKD-EPI equation is less accurate in patients with extremes of muscle mass, extra-renal   metabolism of creatine, excessive creatine ingestion, or following therapy that affects   renal tubular secretion.       Calcium 12/05/2022 9.8  8.6 - 10.4 mg/dL Final    Sodium 12/05/2022 137  135 - 144 mmol/L Final    Potassium 12/05/2022 4.2  3.7 - 5.3 mmol/L Final    Chloride 12/05/2022 103  98 - 107 mmol/L Final    CO2 12/05/2022 22  20 - 31 mmol/L Final    Anion Gap 12/05/2022 12  9 - 17 mmol/L Final    Alkaline Phosphatase 12/05/2022 80  35 - 104 U/L Final    ALT 12/05/2022 14  5 - 33 U/L Final    AST 12/05/2022 17  <32 U/L Final    Total Bilirubin 12/05/2022 0.8  0.3 - 1.2 mg/dL Final    Total Protein 12/05/2022 7.4  6.4 - 8.3 g/dL Final    Albumin 12/05/2022 4.3  3.5 - 5.2 g/dL Final    Albumin/Globulin Ratio 12/05/2022 1.4  1.0 - 2.5 Final    Color, UA 12/05/2022 Yellow  Yellow Final    Turbidity UA 12/05/2022 Clear  Clear Final    Glucose, Ur 12/05/2022 NEGATIVE NEGATIVE Final    Bilirubin Urine 12/05/2022 NEGATIVE  NEGATIVE Final    Ketones, Urine 12/05/2022 MODERATE (A)  NEGATIVE Final    Specific Gravity, UA 12/05/2022 1.013  1.005 - 1.030 Final    Urine Hgb 12/05/2022 NEGATIVE  NEGATIVE Final    pH, UA 12/05/2022 6.5  5.0 - 8.0 Final    Protein, UA 12/05/2022 NEGATIVE  NEGATIVE Final    Urobilinogen, Urine 12/05/2022 Normal  Normal Final    Nitrite, Urine 12/05/2022 NEGATIVE  NEGATIVE Final    Leukocyte Esterase, Urine 12/05/2022 NEGATIVE  NEGATIVE Final    WBC, UA 12/05/2022 2 TO 5  0 - 5 /HPF Final    RBC, UA 12/05/2022 2 TO 5  0 - 4 /HPF Final    Reference range defined for non-centrifuged specimen. Casts UA 12/05/2022 2 TO 5 HYALINE Reference range defined for non-centrifuged specimen. 0 - 8 /LPF Final    Epithelial Cells UA 12/05/2022 2 TO 5  0 - 5 /HPF Final    Specimen Description 12/05/2022 . URINE,STRAIGHT CATHETER   Final    Culture 12/05/2022 NO SIGNIFICANT GROWTH   Final    Amphetamine Screen, Ur 12/05/2022 NEGATIVE  NEGATIVE Final    Comment:       (Positive cutoff 1000 ng/mL)                  Barbiturate Screen, Ur 12/05/2022 NEGATIVE  NEGATIVE Final    Comment:       (Positive cutoff 200 ng/mL)                  Benzodiazepine Screen, Urine 12/05/2022 POSITIVE (A)  NEGATIVE Final    Comment:       (Positive cutoff 200 ng/mL)                  Cocaine Metabolite, Urine 12/05/2022 NEGATIVE  NEGATIVE Final    Comment:       (Positive cutoff 300 ng/mL)                  Methadone Screen, Urine 12/05/2022 NEGATIVE  NEGATIVE Final    Comment:       (Positive cutoff 300 ng/mL)                  Opiates, Urine 12/05/2022 NEGATIVE  NEGATIVE Final    Comment:       (Positive cutoff 300 ng/mL)                  Phencyclidine, Urine 12/05/2022 NEGATIVE  NEGATIVE Final    Comment:       (Positive cutoff 25 ng/mL)                  Cannabinoid Scrn, Ur 12/05/2022 POSITIVE (A)  NEGATIVE Final    Comment:       (Positive cutoff 50 ng/mL)                  Oxycodone Screen, Ur 12/05/2022 NEGATIVE  NEGATIVE Final    Comment:       (Positive cutoff 100 ng/mL)                  Fentanyl, Ur 12/05/2022 NEGATIVE  NEGATIVE Final    Comment:       (Positive cutoff  5 ng/ml)            Test Information 12/05/2022 Assay provides medical screening only. The absence of expected drug(s) and/or metabolite(s) may indicate diluted or adulterated urine, limitations of testing or timing of collection. Final    Comment: Testing for legal purposes should be confirmed by another method. To request confirmation   of test result, please call the lab within 7 days of sample submission. Acetaminophen Level 12/05/2022 <5 (A)  10 - 30 ug/mL Final    Ethanol 12/05/2022 <10  <10 mg/dL Final    Ethanol percent 12/05/2022 <0.010  <3.169 % Final    Salicylate Lvl 20/73/8215 <1 (A)  3 - 10 mg/dL Final    Toxic Tricyclic Sc,Blood 16/08/1843 NEGATIVE  NEGATIVE Final    hCG Qual 12/05/2022 NEGATIVE  NEGATIVE Final    Comment: Specimens with hCG levels near the threshold of the test (25 mIU/mL) may give a negative or   indeterminate result. In such cases, another test should be performed with a new specimen   in 48-72 hours. If early pregnancy is suspected clinically in this setting, correlation   with quantitative serum b-hCG level is suggested. [employer] has confirmed the use of plasma for this test. This has not been cleared   or approved by the U.S. Food and Drug Administration. The FDA has determined that such   clearance is not necessary. Reviewed patient's current plan of care and vital signs with nursing staff.     Labs reviewed: [x] Yes  Last EKG in EMR reviewed: [x] Yes    Medications  Current Facility-Administered Medications: propranolol (INDERAL) tablet 10 mg, 10 mg, Oral, TID  acetaminophen (TYLENOL) tablet 650 mg, 650 mg, Oral, Q4H PRN  aluminum & magnesium hydroxide-simethicone (MAALOX) 200-200-20 MG/5ML suspension 30 mL, 30 mL, Oral, Q6H PRN  haloperidol lactate (HALDOL) injection 5 mg, 5 mg, IntraMUSCular, Q4H PRN **AND** diphenhydrAMINE (BENADRYL) injection 50 mg, 50 mg, IntraMUSCular, Q4H PRN **AND** LORazepam (ATIVAN) injection 2 mg, 2 mg, IntraMUSCular, Q4H PRN  haloperidol (HALDOL) tablet 5 mg, 5 mg, Oral, Q4H PRN **AND** LORazepam (ATIVAN) tablet 2 mg, 2 mg, Oral, Q4H PRN  hydrOXYzine HCl (ATARAX) tablet 50 mg, 50 mg, Oral, TID PRN  ibuprofen (ADVIL;MOTRIN) tablet 400 mg, 400 mg, Oral, Q6H PRN  nicotine polacrilex (NICORETTE) gum 2 mg, 2 mg, Oral, PRN  polyethylene glycol (GLYCOLAX) packet 17 g, 17 g, Oral, Daily PRN  traZODone (DESYREL) tablet 50 mg, 50 mg, Oral, Nightly PRN  pseudoephedrine-guaiFENesin (MUCINEX D)  MG per extended release tablet 2 tablet, 2 tablet, Oral, Q12H  escitalopram (LEXAPRO) tablet 5 mg, 5 mg, Oral, Daily  risperiDONE (RISPERDAL) tablet 0.5 mg, 0.5 mg, Oral, BID    ASSESSMENT  Acute psychosis ([facility] [address]         HANDOFF  Patient's symptoms: are modestly improving, but are unstable for discharge  Medications as determined by attending physician  Consider titrating risperidone 1 mg twice daily  Encourage participation in groups and milieu. Probable discharge is to be determined by MD    Electronically signed by MURPHY Marie CNP on 12/7/2022 at 3:28 PM    **This report has been created using voice recognition software. It may contain minor errors which are inherent in voice recognition technology. **     I independently saw and evaluated the patient. I reviewed the nurse practitioners documentation above. Any additional comments or changes to the nurse practitioners documentation are stated below otherwise agree with assessment. Plan will be as follows:  Discussed risk benefits and alternatives with the patient including increasing Risperdal.  Patient declined to move forward with this at this time and wants to observe another day and new medication.   Reasonable request and will monitor in mid  PLAN  Patient s symptoms   show modest signs of improvement  Monitor on current medication for now  Attempt to develop insight  Psycho-education conducted. Supportive Therapy conducted.   Probable discharge is likely 2 to 3 days  Follow-up daily while on inpatient unit

## 2022-12-07 NOTE — PROGRESS NOTES
12/07/22 1531   Encounter Summary   Encounter Overview/Reason  Spiritual/Emotional Needs   Service Provided For: Patient   Referral/Consult From: Ousmane   Last Encounter  12/07/22   Complexity of Encounter Moderate   Begin Time 0230   End Time  0300   Total Time Calculated 30 min   Spiritual/Emotional needs   Type Spiritual Support   Behavioral Health    Type  [facility]   Assessment/Intervention/Outcome   Assessment Calm   Intervention Active listening;Prayer (assurance of)/[place];Sustaining Presence/Ministry of presence   Outcome Receptive; Expressed Gratitude;Engaged in conversation

## 2022-12-07 NOTE — GROUP NOTE
Group Therapy Note    Date: 12/7/2022    Group Start Time: 0900  Group End Time: 0930  Group Topic: Nursing    ABRAHAM BHI C    Analilia Batista LPN        Group Therapy Note    Attendees: 7/20       Patient's Goal:   Goal setting    Notes:   Patient understanding the importance of goal setting    Status After Intervention:  Improved    Participation Level: Interactive    Participation Quality: Attentive and Sharing      Speech:  normal      Thought Process/Content: Logical      Affective Functioning: Congruent      Mood: anxious      Level of consciousness:  Alert      Response to Learning: Able to verbalize/acknowledge new learning      Endings: None Reported    Modes of Intervention: Education and Support      Discipline Responsible: Licensed Practical Nurse      Signature:  Analilia Batista LPN

## 2022-12-07 NOTE — GROUP NOTE
Group Therapy Note    Date: 12/7/2022    Group Start Time: 1000  Group End Time: 1040  Group Topic: Psychotherapy    ABRAHAM JACOME    ZAIRA Zelaya LSW        Group Therapy Note    Attendees: 7/20       Patient's Goal:  Discussion on Healthy vs Unhealthy Coping Strategies    Status After Intervention:  Improved    Participation Level:  Active Listener and Interactive    Participation Quality: Appropriate, Attentive, and Sharing      Speech:  normal      Thought Process/Content: Logical      Affective Functioning: Congruent      Mood: euthymic      Level of consciousness:  Alert, Oriented x4, and Attentive      Response to Learning: Able to verbalize current knowledge/experience and Able to verbalize/acknowledge new learning      Endings: None Reported    Modes of Intervention: Education, Support, and Socialization      Discipline Responsible: /Counselor      Signature:  ZAIRA Zelaya LSW

## 2022-12-07 NOTE — PLAN OF CARE
Problem: Depression/Self Harm  Goal: Effect of psychiatric condition will be minimized and patient will be protected from self harm  Description: INTERVENTIONS:  1. Assess impact of patient's symptoms on level of functioning, self care needs and offer support as indicated  2. Assess patient/family knowledge of depression, impact on illness and need for teaching  3. Provide emotional support, presence and reassurance  4. Assess for possible suicidal thoughts or ideation. If patient expresses suicidal thoughts or statements do not leave alone, initiate Suicide Precautions, move to a room close to the nursing station and obtain sitter  5. Initiate consults as appropriate with Mental Health Professional, Spiritual Care, Psychosocial CNS, and consider a recommendation to the LIP for a Psychiatric Consultation  12/6/2022 2236 by Darra Fothergill, LPN  Note: Patient denies thoughts of self harm at this time. Patient agrees to seek out staff if they begin having thoughts of harming self or they need to talk. Q15min safety checks continue      Problem: Pain  Goal: Verbalizes/displays adequate comfort level or baseline comfort level  Note: Patient had minimal pain today and asked for pain medication. Was educated on relaxation techniques to help calm the body down when in pain. Educated to ask staff for pain medication when in need. Q15 min safety checks.

## 2022-12-07 NOTE — GROUP NOTE
Group Therapy Note    Date: 12/7/2022    Group Start Time: 1330  Group End Time: 8117  Group Topic: Psychoeducation    ABRAHAM BHJUAN CARLOS Jimenez, SUZIES    Group Therapy Note    Attendees: 8     Patient's Goal:  Patient will identify benefits of creative expression for coping and stress management. Notes:  Patient attended group and participated. Status After Intervention:  Improved    Participation Level:  Active Listener and Interactive    Participation Quality: Appropriate and Attentive      Speech:  Normal      Thought Process/Content: Logical  Linear      Affective Functioning: Blunted      Mood: Euthymic      Level of consciousness:  Alert, Oriented x4, and Attentive      Response to Learning: Able to verbalize current knowledge/experience, Able to verbalize/acknowledge new learning, Able to retain information, Able to change behavior, and Progressing to goal      Endings: None Reported    Modes of Intervention: Education, Support, Socialization, and Exploration      Discipline Responsible: Psychoeducational Specialist      Signature:  Charlsie Bloch, [address]

## 2022-12-07 NOTE — BH NOTE
Patient was much more calm, PRN effective. Patient in and out of bed and eventually laid down in bed with encouragement from staff. Has been resting quietly and asleep.

## 2022-12-07 NOTE — BH NOTE
Patient given tobacco quitline number [phone] at this time, refusing to call at this time, states \" I just dont want to quit now\"- patient given information as to the dangers of long term tobacco use. Continue to reinforce the importance of tobacco cessation.

## 2022-12-07 NOTE — BH NOTE
Patient became agitated as evidenced by pacing and continual slamming of door walking out into common area and up to desk asking when she was leaving and to make phone calls. Repeated this scenario multiple times. Patient would walk to door and open it stand there close it and repeat a dozen times and just walk back into room. PRN medication was offered and patient was accepting of it. Haldol, Ativan and Benadryl PRN IM. Medicine was given with no issues. Patient currently lying in bed. Q15min safety checks continuous for patient safety.

## 2022-12-07 NOTE — GROUP NOTE
Psych-Ed/Relapse Prevention Group Note        Date: December 7, 2022 Start Time: 11am  End Time: 11:45am      Number of Participants in Group & Unit Census:  7    Topic: Communication skills    Goal of Group:Patient will identify benefits of alternative communication methods to improve relationships. Comments:     Patient did not participate in Psych-Ed/Relapse Prevention group, despite staff encouragement and explanation of benefits. Patient remain seclusive to self. Q15 minute safety checks maintained for patient safety and will continue to encourage patient to attend unit programming.          Signature:  Lj Sigala, [address]

## 2022-12-08 VITALS
RESPIRATION RATE: 14 BRPM | BODY MASS INDEX: 41.18 KG/M2 | HEIGHT: 69 IN | SYSTOLIC BLOOD PRESSURE: 128 MMHG | TEMPERATURE: 97.7 F | WEIGHT: 278 LBS | DIASTOLIC BLOOD PRESSURE: 87 MMHG | HEART RATE: 93 BPM

## 2022-12-08 PROCEDURE — 6370000000 HC RX 637 (ALT 250 FOR IP): Performed by: PSYCHIATRY & NEUROLOGY

## 2022-12-08 RX ORDER — HYDROXYZINE 50 MG/1
50 TABLET, FILM COATED ORAL 3 TIMES DAILY PRN
Qty: 30 TABLET | Refills: 0 | Status: SHIPPED | OUTPATIENT
Start: 2022-12-08 | End: 2022-12-18

## 2022-12-08 RX ORDER — RISPERIDONE 0.5 MG/1
0.5 TABLET ORAL 2 TIMES DAILY
Qty: 60 TABLET | Refills: 0 | Status: SHIPPED | OUTPATIENT
Start: 2022-12-08

## 2022-12-08 RX ORDER — TRAZODONE HYDROCHLORIDE 50 MG/1
50 TABLET ORAL NIGHTLY PRN
Qty: 30 TABLET | Refills: 0 | Status: SHIPPED | OUTPATIENT
Start: 2022-12-08

## 2022-12-08 RX ORDER — PROPRANOLOL HYDROCHLORIDE 10 MG/1
10 TABLET ORAL 3 TIMES DAILY
Qty: 90 TABLET | Refills: 0 | Status: SHIPPED | OUTPATIENT
Start: 2022-12-08

## 2022-12-08 RX ORDER — ESCITALOPRAM OXALATE 5 MG/1
5 TABLET ORAL DAILY
Qty: 30 TABLET | Refills: 3 | Status: SHIPPED | OUTPATIENT
Start: 2022-12-09

## 2022-12-08 RX ADMIN — RISPERIDONE 0.5 MG: 0.5 TABLET ORAL at 08:57

## 2022-12-08 RX ADMIN — PROPRANOLOL HYDROCHLORIDE 10 MG: 20 TABLET ORAL at 15:07

## 2022-12-08 RX ADMIN — NICOTINE POLACRILEX 2 MG: 2 GUM, CHEWING BUCCAL at 07:16

## 2022-12-08 RX ADMIN — NICOTINE POLACRILEX 2 MG: 2 GUM, CHEWING BUCCAL at 10:09

## 2022-12-08 RX ADMIN — NICOTINE POLACRILEX 2 MG: 2 GUM, CHEWING BUCCAL at 08:57

## 2022-12-08 RX ADMIN — ESCITALOPRAM OXALATE 5 MG: 10 TABLET ORAL at 08:57

## 2022-12-08 RX ADMIN — PROPRANOLOL HYDROCHLORIDE 10 MG: 20 TABLET ORAL at 08:57

## 2022-12-08 NOTE — PROGRESS NOTES
*Patient called to writer to come visit her in her room    *Patient wanted to talk about some issues and she requested prayer    *Patient stated that she relies on her [place] daily       12/08/22 1413   Encounter Summary   Encounter Overview/Reason  Spiritual/Emotional Needs   Service Provided For: Patient   Referral/Consult From: Patient   Last Encounter  12/08/22   Complexity of Encounter Moderate   Begin Time 0150   End Time  0200   Total Time Calculated 10 min   Spiritual/Emotional needs   Type Spiritual Support   Behavioral Health    Type  Other (comment)  (Visit)   Assessment/Intervention/Outcome   Assessment Calm;Tearful   Intervention Active listening;Discussed belief system/Rastafari practices/[place];Grief Care;Nurtured Hope;Prayer (assurance of)/[place];Read/Provided Scripture;Sustaining Presence/Ministry of presence   Outcome Receptive;Grieving;Expressed Gratitude;Expressed feelings, needs, and concerns;Engaged in conversation;Encouraged;Coping;Comfort

## 2022-12-08 NOTE — BH NOTE
Patient given tobacco quitline number 61423929211 at this time, refusing to call at this time and states they are not interested in quitting. Will consider options in the future. Patient given information as to the dangers of long term tobacco use. Continue to reinforce the importance of tobacco cessation.

## 2022-12-08 NOTE — DISCHARGE INSTRUCTIONS
Information:  Medications:   Medication summary provided   I understand that I should take only the medications on my list.     -why and when I need to take each medicine.     -which side effects to watch for.     -that I should carry my medication information at all times in case of     Emergency situations. I will take all of my medicines to follow up appointments.     -check with my physician or pharmacist before taking any new    Medication, over the counter product or drink alcohol.    -Ask about food, drug or dietary supplement interactions.    -discard old lists and update records with medication providers. Notify Physician:  Notify physician if you notice:   Always call 911 if you feel your life is in danger  In case of an emergency call 911 immediately! If 911 is not available call your local emergency medical system for help    Behavioral Health Follow Up:  Original Referral Source:ED   Discharge Diagnosis: Acute psychosis ([facility] [address] [F23]  Recommendations for Level of Care: follow up  Patient status at discharge: stable  [facility]  [facility]  Aftercare plan faxed: marianna   -faxed by: nurse   -date: 12/8   -time: 1400  Prescriptions: filled    Smoking: Quit Smoking. Call the [employer]'s smoking quitline at 3-383-24U-QUIT  Know the signs of a heart attack   If you have any of the following symptoms call 911 immediately, do not wait more    Than five minutes. 1. Pressure, fullness and/ or squeezing in the center of the chest spreading to    The jaw, neck or shoulder. 2. Chest discomfort with light headedness, fainting, sweating, nausea or    Shortness of breath. 3. Upper abdominal pressure or discomfort. 4. Lower chest pain, back pain, unusual fatigue, shortness of breath, nausea   Or dizziness.      General Information:   Questions regarding your bill: Call HELP program [phone]     Suicide Hotline ([address])  (327) 576-8839      Recovery Help line- 242.420.8625      To obtain results of pending studies call Medical Records at: [phone]     For emergencies and 24 hour/7 days a week contact information:  [phone]

## 2022-12-08 NOTE — PLAN OF CARE
Problem: Pain  Goal: Verbalizes/displays adequate comfort level or baseline comfort level  12/8/2022 0244 by Sienna Gotti LPN  Outcome: Progressing  Note: Patient is out in dayroom, isolative to self. Problem: Coping  Goal: Pt/Family able to verbalize concerns and demonstrate effective coping strategies  Description: INTERVENTIONS:  1. Assist patient/family to identify coping skills, available support systems and cultural and spiritual values  2. Provide emotional support, including active listening and acknowledgement of concerns of patient and caregivers  3. Reduce environmental stimuli, as able  4. Instruct patient/family in relaxation techniques, as appropriate  5. Assess for spiritual pain/suffering and initiate Spiritual Care, Psychosocial Clinical Specialist consults as needed  12/8/2022 0244 by Sienna Gotti LPN  Outcome: Progressing  Note: Patient has been calm, controlled and med complaint. Accepting of 1:1 talk time with staff. Problem: Depression/Self Harm  Goal: Effect of psychiatric condition will be minimized and patient will be protected from self harm  Description: INTERVENTIONS:  1. Assess impact of patient's symptoms on level of functioning, self care needs and offer support as indicated  2. Assess patient/family knowledge of depression, impact on illness and need for teaching  3. Provide emotional support, presence and reassurance  4. Assess for possible suicidal thoughts or ideation. If patient expresses suicidal thoughts or statements do not leave alone, initiate Suicide Precautions, move to a room close to the nursing station and obtain sitter  5. Initiate consults as appropriate with Mental Health Professional, Spiritual Care, Psychosocial CNS, and consider a recommendation to the LIP for a Psychiatric Consultation  12/8/2022 0244 by Sienna Gotti LPN  Outcome: Progressing  Note: Patient denies thoughts of self-harm at this time.

## 2022-12-08 NOTE — BH NOTE
[address] [facility]  Discharge Note    Pt discharged with followings belongings:   Dental Appliances: None  Vision - Corrective Lenses: None  Hearing Aid: None  Jewelry: None  Body Piercings Removed: N/A  Clothing: Footwear, Jacket/Coat, Pants, Shirt, Socks, Undergarments  Other Valuables: Purse, Wallet, Keys, Money, Other (Comment) (23.25, vape)   Valuables sent home withpatient or returned to patient. Patient educated on aftercare instructions: given  Information faxed to [facility] by nurse  at 4:48 PM .Patient verbalize understanding of AVS:  yes. Transported home by mother    Status EXAM upon discharge:  Mental Status and Behavioral Exam  Normal: No  Level of Assistance: Independent/Self  Facial Expression: Flat  Affect: Blunt  Level of Consciousness: Alert  Frequency of Checks: 4 times per hour, close  Mood:Normal: No  Mood: Anxious, Depressed  Motor Activity:Normal: Yes  Motor Activity: Decreased  Eye Contact: Fair  Observed Behavior: Guarded  Sexual Misconduct History: Current - no  Preception: Gonzales to person, Gonzales to time, Gonzales to place, Gonzales to situation  Attention:Normal: No  Attention: Distractible  Thought Processes: Blocking  Thought Content:Normal: No  Thought Content: Preoccupations  Depression Symptoms: Isolative  Anxiety Symptoms: Generalized  Carla Symptoms: No problems reported or observed.   Hallucinations: None  Delusions: No  Memory:Normal: No  Memory: Poor recent, Poor remote  Insight and Judgment: No  Insight and Judgment: Poor insight, Poor judgment    Tobacco Screening:  Practical Counseling, on admission, fito X, if applicable and completed (first 3 are required if patient doesn't refuse)r:            ( ) Recognizing danger situations (included triggers and roadblocks)                    ( ) Coping skills (new ways to manage stress,relaxation techniques, changing routine, distraction)                                                           ( ) Basic information about quitting (benefits of quitting, techniques in how to quit, available resources  ( ) Referral for counseling faxed to [facility]                                                                                                                   ( ) Patient refused counseling  ( ) Patient refused referral  ( ) Patient refused prescription upon discharge  ( ) Patient has not smoked in the last 30 days    Metabolic Screening:    No results found for: LABA1C    Lab Results   Component Value Date    CHOL 119 03/07/2022     Lab Results   Component Value Date    TRIG 46 03/07/2022     Lab Results   Component Value Date    HDL 55 03/07/2022     No components found for: LDLCAL  No results found for: Girma Alexander LPN

## 2022-12-08 NOTE — GROUP NOTE
Group Therapy Note    Date: 12/8/2022    Group Start Time: 0930  Group End Time: 1000  Group Topic: [address] [facility]    Chelo Smith        Group Therapy Note    Attendees: 8/20       Patient's Goal:  To go home, pray, socialize, and see . Status After Intervention:  Improved    Participation Level:  Active Listener and Interactive    Participation Quality: Appropriate, Attentive, Sharing, and Supportive      Speech:  normal      Thought Process/Content: Logical  Linear      Affective Functioning: Congruent and Flat      Mood:  Calm      Level of consciousness:  Alert, Oriented x4, and Attentive      Response to Learning: Able to verbalize current knowledge/experience and Capable of insight      Endings: None Reported    Modes of Intervention: Education, Support, Socialization, Exploration, and Problem-solving      Discipline Responsible: [address] [employer]      Signature:  Chelo Smith

## 2022-12-08 NOTE — PROGRESS NOTES
CLINICAL PHARMACY NOTE: MEDS TO BEDS    Total # of Prescriptions Filled: 6   The following medications were delivered to the patient:  Escitalopram Oxalate 5mg  Hydroxyzine HCL 25mg  Nicotine Polacrilex 2mg Gum  Propranolol HCL 10mg  Trazodone HCL 50mg  Risperidone 0.5mg    Additional Documentation:  Delivered Medication to Jluis Cameron

## 2022-12-08 NOTE — GROUP NOTE
Group Therapy Note    Date: 12/8/2022    Group Start Time: 6421  Group End Time: 1430  Group Topic: Psychoeducation    ABRAHAM Jimenez, SUZIES    Group Therapy Note    Attendees: 7     Patient's Goal:  Patient will demonstrate improved interpersonal skills. Notes:  Patient attended group and participated. Status After Intervention:  Improved    Participation Level:  Active Listener and Interactive    Participation Quality: Appropriate, Attentive, Sharing      Speech:  normal      Thought Process/Content: Logical      Affective Functioning: Incongruent      Mood: Labile      Level of consciousness:  Alert      Response to Learning: Able to verbalize current knowledge/experience      Endings: None Reported    Modes of Intervention: Education, Support, Socialization, and Exploration      Discipline Responsible: Psychoeducational Specialist      Signature:  Frankey Commander, [address]

## 2022-12-08 NOTE — CARE COORDINATION
LIV contacted [facility] therapy agency to assist with followup appointment, rep states their agency is not providing therapy at this time because of departure of therapists from agency, that they can offer case management only. LIV then discussed alternate outpatient options, pt requests to be linked with [facility].
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
## 2024-05-10 ENCOUNTER — HOSPITAL ENCOUNTER (OUTPATIENT)
Age: 24
Discharge: HOME | End: 2024-05-10
Payer: MEDICAID

## 2024-05-10 LAB
ALBUMIN SERPL-MCNC: 4.3 G/DL (ref 3.5–5.2)
ALBUMIN/GLOB SERPL: 2 {RATIO} (ref 1–2.5)
ALP SERPL-CCNC: 69 U/L (ref 35–104)
ALT SERPL-CCNC: 12 U/L (ref 10–35)
ANION GAP SERPL CALCULATED.3IONS-SCNC: 9 MMOL/L (ref 9–16)
AST SERPL-CCNC: 23 U/L (ref 10–35)
BASOPHILS # BLD: 0.04 K/UL (ref 0–0.2)
BASOPHILS NFR BLD: 1 % (ref 0–2)
BILIRUB SERPL-MCNC: 0.8 MG/DL (ref 0–1.2)
BUN SERPL-MCNC: 9 MG/DL (ref 6–20)
CALCIUM SERPL-MCNC: 9 MG/DL (ref 8.6–10.4)
CHLORIDE SERPL-SCNC: 106 MMOL/L (ref 98–107)
CHOLEST SERPL-MCNC: 123 MG/DL (ref 0–199)
CHOLESTEROL/HDL RATIO: 3
CO2 SERPL-SCNC: 22 MMOL/L (ref 20–31)
CREAT SERPL-MCNC: 0.8 MG/DL (ref 0.5–0.9)
EOSINOPHIL # BLD: 0.21 K/UL (ref 0–0.44)
EOSINOPHILS RELATIVE PERCENT: 3 % (ref 1–4)
ERYTHROCYTE [DISTWIDTH] IN BLOOD BY AUTOMATED COUNT: 12.7 % (ref 11.8–14.4)
GFR, ESTIMATED: >90 ML/MIN/1.73M2
GLUCOSE SERPL-MCNC: 88 MG/DL (ref 74–99)
HCT VFR BLD AUTO: 40.5 % (ref 36.3–47.1)
HDLC SERPL-MCNC: 42 MG/DL
HGB BLD-MCNC: 13.4 G/DL (ref 11.9–15.1)
IMM GRANULOCYTES # BLD AUTO: 0.03 K/UL (ref 0–0.3)
IMM GRANULOCYTES NFR BLD: 1 %
LDLC SERPL CALC-MCNC: 71 MG/DL (ref 0–100)
LYMPHOCYTES NFR BLD: 2.18 K/UL (ref 1.1–3.7)
LYMPHOCYTES RELATIVE PERCENT: 34 % (ref 24–43)
MCH RBC QN AUTO: 30.4 PG (ref 25.2–33.5)
MCHC RBC AUTO-ENTMCNC: 33.1 G/DL (ref 28.4–34.8)
MCV RBC AUTO: 91.8 FL (ref 82.6–102.9)
MONOCYTES NFR BLD: 0.53 K/UL (ref 0.1–1.2)
MONOCYTES NFR BLD: 8 % (ref 3–12)
NEUTROPHILS NFR BLD: 53 % (ref 36–65)
NEUTS SEG NFR BLD: 3.49 K/UL (ref 1.5–8.1)
NRBC BLD-RTO: 0 PER 100 WBC
PLATELET # BLD AUTO: 261 K/UL (ref 138–453)
PMV BLD AUTO: 11.5 FL (ref 8.1–13.5)
POTASSIUM SERPL-SCNC: 4.1 MMOL/L (ref 3.7–5.3)
PROT SERPL-MCNC: 6.9 G/DL (ref 6.6–8.7)
RBC # BLD AUTO: 4.41 M/UL (ref 3.95–5.11)
SODIUM SERPL-SCNC: 137 MMOL/L (ref 136–145)
TRIGL SERPL-MCNC: 50 MG/DL
TSH SERPL DL<=0.05 MIU/L-ACNC: 1.02 UIU/ML (ref 0.27–4.2)
VLDLC SERPL CALC-MCNC: 10 MG/DL
WBC OTHER # BLD: 6.5 K/UL (ref 3.5–11.3)

## 2024-05-10 PROCEDURE — 80053 COMPREHEN METABOLIC PANEL: CPT

## 2024-05-10 PROCEDURE — 80061 LIPID PANEL: CPT

## 2024-05-10 PROCEDURE — 36415 COLL VENOUS BLD VENIPUNCTURE: CPT

## 2024-05-10 PROCEDURE — 84443 ASSAY THYROID STIM HORMONE: CPT

## 2024-05-10 PROCEDURE — 85025 COMPLETE CBC W/AUTO DIFF WBC: CPT

## 2024-05-12 LAB
EKG ATRIAL RATE: 68 BPM
EKG P AXIS: 53 DEGREES
EKG P-R INTERVAL: 170 MS
EKG Q-T INTERVAL: 406 MS
EKG QRS DURATION: 90 MS
EKG QTC CALCULATION (BAZETT): 431 MS
EKG R AXIS: 63 DEGREES
EKG T AXIS: 48 DEGREES
EKG VENTRICULAR RATE: 68 BPM

## 2024-08-19 ENCOUNTER — HOSPITAL ENCOUNTER (OUTPATIENT)
Age: 24
Discharge: HOME | End: 2024-08-21
Payer: MEDICAID

## 2024-08-19 ENCOUNTER — HOSPITAL ENCOUNTER (OUTPATIENT)
Dept: GENERAL RADIOLOGY | Age: 24
Discharge: HOME | End: 2024-08-21
Payer: MEDICAID

## 2024-08-19 DIAGNOSIS — M25.561 PAIN, JOINT, KNEE, RIGHT: ICD-10-CM

## 2024-08-19 PROCEDURE — 73562 X-RAY EXAM OF KNEE 3: CPT

## 2024-11-25 NOTE — ED NOTES
Patient resting calmly in bed at this time. Patient was given IM medication to calm her down. Patient repeatedly asking to \"talk this out\" but ultimately let nurse give medication. Patient then screamed the lords prayer over again repeatedly.       Andrae Jones RN  12/05/22 1291 General